# Patient Record
Sex: FEMALE
[De-identification: names, ages, dates, MRNs, and addresses within clinical notes are randomized per-mention and may not be internally consistent; named-entity substitution may affect disease eponyms.]

---

## 2018-12-30 ENCOUNTER — TRANSCRIPTION ENCOUNTER (OUTPATIENT)
Age: 63
End: 2018-12-30

## 2020-08-17 ENCOUNTER — APPOINTMENT (OUTPATIENT)
Dept: RHEUMATOLOGY | Facility: CLINIC | Age: 65
End: 2020-08-17
Payer: COMMERCIAL

## 2020-08-17 VITALS
OXYGEN SATURATION: 100 % | WEIGHT: 118 LBS | HEIGHT: 63 IN | BODY MASS INDEX: 20.91 KG/M2 | HEART RATE: 75 BPM | DIASTOLIC BLOOD PRESSURE: 72 MMHG | RESPIRATION RATE: 14 BRPM | SYSTOLIC BLOOD PRESSURE: 135 MMHG

## 2020-08-17 DIAGNOSIS — Z79.899 OTHER LONG TERM (CURRENT) DRUG THERAPY: ICD-10-CM

## 2020-08-17 DIAGNOSIS — M81.0 AGE-RELATED OSTEOPOROSIS W/OUT CURRENT PATHOLOGICAL FRACTURE: ICD-10-CM

## 2020-08-17 DIAGNOSIS — Z87.19 PERSONAL HISTORY OF OTHER DISEASES OF THE DIGESTIVE SYSTEM: ICD-10-CM

## 2020-08-17 DIAGNOSIS — K21.9 GASTRO-ESOPHAGEAL REFLUX DISEASE W/OUT ESOPHAGITIS: ICD-10-CM

## 2020-08-17 PROCEDURE — 99205 OFFICE O/P NEW HI 60 MIN: CPT | Mod: GC

## 2020-08-17 NOTE — DATA REVIEWED
[FreeTextEntry1] : DEXA 8/21/19:\par T-scores:  spine -2.8 (L4 -3.6);  left FN -1.9,  right FN -2.3;  left TH -1.5;  right TH -2.2;  radius UD -6

## 2020-08-17 NOTE — HISTORY OF PRESENT ILLNESS
[Arthralgias] : arthralgias [FreeTextEntry1] : 65yo F with PMHx of GERD and osteoporosis referred to the clinic by PCP for osteoporosis treatment. The patient reports that in 8/2019, she was found to have Osteoporosis on a regular dexa scan ordered by her PCP. she was found to have a T score of -3 on lumbar spine and -6 in radial/ulnar head. initially she was referred to another rheumatologist who started a process for teriparatide/denosumab treatment for osteoporosis. due to the covid pandemic she was unable to complete the process and was unable to return for a rheumatology follow up.  She is today in the clinic to resume her prior process of osteoporosis treatment. \par \par Today the patient feel well with minimal complains. she has had mild left ankle discomfort after prolong work hours that improve with rest. she has gerd symptoms that improved with conservative measures.\par Pt denies any hx of fractures.  No hx of steroid use.  No hx of hip fracture in parents.

## 2020-08-17 NOTE — PHYSICAL EXAM
[General Appearance - Alert] : alert [General Appearance - In No Acute Distress] : in no acute distress [Sclera] : the sclera and conjunctiva were normal [PERRL With Normal Accommodation] : pupils were equal in size, round, and reactive to light [Neck Appearance] : the appearance of the neck was normal [Auscultation Breath Sounds / Voice Sounds] : lungs were clear to auscultation bilaterally [Heart Rate And Rhythm] : heart rate was normal and rhythm regular [Heart Sounds] : normal S1 and S2 [Cervical Lymph Nodes Enlarged Posterior Bilaterally] : posterior cervical [Cervical Lymph Nodes Enlarged Anterior Bilaterally] : anterior cervical [Supraclavicular Lymph Nodes Enlarged Bilaterally] : supraclavicular [Abnormal Walk] : normal gait [Nail Clubbing] : no clubbing  or cyanosis of the fingernails [Musculoskeletal - Swelling] : no joint swelling seen [Motor Tone] : muscle strength and tone were normal [Oriented To Time, Place, And Person] : oriented to person, place, and time [Outer Ear] : the ears and nose were normal in appearance [Oropharynx] : the oropharynx was normal [Neck Cervical Mass (___cm)] : no neck mass was observed [Thyroid Diffuse Enlargement] : the thyroid was not enlarged [Jugular Venous Distention Increased] : there was no jugular-venous distention [Heart Sounds Gallop] : no gallops [Thyroid Nodule] : there were no palpable thyroid nodules [Murmurs] : no murmurs [Heart Sounds Pericardial Friction Rub] : no pericardial rub [Abdomen Tenderness] : non-tender [Abdomen Soft] : soft [Bowel Sounds] : normal bowel sounds [Edema] : there was no peripheral edema [Abdomen Mass (___ Cm)] : no abdominal mass palpated [] : no hepato-splenomegaly [Skin Turgor] : normal skin turgor [Skin Color & Pigmentation] : normal skin color and pigmentation [Affect] : the affect was normal [No Focal Deficits] : no focal deficits [Impaired Insight] : insight and judgment were intact [FreeTextEntry1] : No synovitis, full ROM in all joints\par

## 2020-08-17 NOTE — ASSESSMENT
[FreeTextEntry1] : 64 Y F with PMHx of Osteoporosis and GERD presented to the clinic to resume  her Osteoporosis care and treatment plan.\par \par \par Impression: \par Osteoporosis with -3 T score on lumbar spine and -6 Radial bone on DEXA scan\par \par \par Impression:\par Treatment options discussed including her prior prescribed treatment , teriparatide and denosumab\par -will continue with teriparatire therapy once the patient clarify with her insurance her coverage.\par -reflux disease and T score of -6 on radial dexa among the reasons to consider denosumab and parathyroid hormone therapy. \par -the patient will return for follow up \par \par d/w Dr. Mendoza\par

## 2020-08-17 NOTE — CONSULT LETTER
[Dear  ___] : Dear  [unfilled], [Consult Letter:] : I had the pleasure of evaluating your patient, [unfilled]. [Please see my note below.] : Please see my note below. [Sincerely,] : Sincerely, [Consult Closing:] : Thank you very much for allowing me to participate in the care of this patient.  If you have any questions, please do not hesitate to contact me. [FreeTextEntry3] : Felipe Mendoza MD\par Rheumatology\par [facility]\par  of Medicine\par [facility] \par \par [address]\[place] [address]\par \par [address]\[place] [address]\par \par [address] [address]\par \par phone:  [phone]\par fax:      [phone]\par

## 2020-08-17 NOTE — END OF VISIT
[] : Fellow [FreeTextEntry3] : I performed a history and physical exam of the patient and discussed his management with the fellow. I reviewed the fellow’s note and agree with the documented findings and plan of care.  64 year old female presents with severe osteoporosis and severe GERD.  Will plan to start Tymlos.  However, pt reports that she was previously told that her copay would be very high.  If she is unable to afford it, will plan to start Prolia instead.  Also discussed importance of calcium/vit D supplementation and weight bearing exercise.

## 2020-08-19 RX ORDER — ABALOPARATIDE 2000 UG/ML
3120 INJECTION, SOLUTION SUBCUTANEOUS
Qty: 1 | Refills: 11 | Status: ACTIVE | COMMUNITY
Start: 2020-08-17

## 2023-11-07 ENCOUNTER — APPOINTMENT (OUTPATIENT)
Dept: ORTHOPEDIC SURGERY | Facility: CLINIC | Age: 68
End: 2023-11-07
Payer: MEDICARE

## 2023-11-07 VITALS — BODY MASS INDEX: 20.91 KG/M2 | HEIGHT: 63 IN | WEIGHT: 118 LBS

## 2023-11-07 DIAGNOSIS — M54.12 RADICULOPATHY, CERVICAL REGION: ICD-10-CM

## 2023-11-07 DIAGNOSIS — M75.21 BICIPITAL TENDINITIS, RIGHT SHOULDER: ICD-10-CM

## 2023-11-07 DIAGNOSIS — M75.41 IMPINGEMENT SYNDROME OF RIGHT SHOULDER: ICD-10-CM

## 2023-11-07 PROCEDURE — 99204 OFFICE O/P NEW MOD 45 MIN: CPT | Mod: 25

## 2023-11-07 PROCEDURE — 73030 X-RAY EXAM OF SHOULDER: CPT | Mod: RT

## 2023-11-07 PROCEDURE — 73010 X-RAY EXAM OF SHOULDER BLADE: CPT | Mod: RT

## 2023-11-08 RX ORDER — METHYLPREDNISOLONE 4 MG/1
4 TABLET ORAL
Qty: 1 | Refills: 0 | Status: ACTIVE | COMMUNITY
Start: 2023-11-07 | End: 1900-01-01

## 2023-11-08 RX ORDER — DICLOFENAC SODIUM 1% 10 MG/G
1 GEL TOPICAL DAILY
Qty: 1 | Refills: 2 | Status: ACTIVE | COMMUNITY
Start: 2023-11-07 | End: 1900-01-01

## 2023-12-19 ENCOUNTER — APPOINTMENT (OUTPATIENT)
Dept: ORTHOPEDIC SURGERY | Facility: CLINIC | Age: 68
End: 2023-12-19

## 2024-04-02 ENCOUNTER — APPOINTMENT (OUTPATIENT)
Dept: ORTHOPEDIC SURGERY | Facility: CLINIC | Age: 69
End: 2024-04-02
Payer: MEDICARE

## 2024-04-02 VITALS — WEIGHT: 125 LBS | BODY MASS INDEX: 22.15 KG/M2 | HEIGHT: 63 IN

## 2024-04-02 DIAGNOSIS — M81.0 AGE-RELATED OSTEOPOROSIS W/OUT CURRENT PATHOLOGICAL FRACTURE: ICD-10-CM

## 2024-04-02 PROCEDURE — 99213 OFFICE O/P EST LOW 20 MIN: CPT

## 2024-04-02 PROCEDURE — 73630 X-RAY EXAM OF FOOT: CPT | Mod: LT

## 2024-04-02 PROCEDURE — 73600 X-RAY EXAM OF ANKLE: CPT | Mod: LT

## 2024-04-02 RX ORDER — ZOLEDRONIC ACID 5 MG/100ML
INJECTION, SOLUTION INTRAVENOUS
Refills: 0 | Status: ACTIVE | COMMUNITY

## 2024-04-02 NOTE — HISTORY OF PRESENT ILLNESS
[0] : 0 [9] : 9 [Dull/Aching] : dull/aching [Frequent] : frequent [Leisure] : leisure [Rest] : rest [Stairs] : stairs [Walking] : walking [de-identified] : 4/2/2024: 69yo F here for intermittent left foot and ankle pain that began since 2023. No injury. Pain worsens with prolonged walking and stairs. Pain starts in the bottom of the foot and radiates up into the ankle. She feels there is weakness. No n/t. Saw a podiatrist and had 2 injections to the dorsal foot that helped slightly. Used shoe inserts which did not help. Tried 8 weeks of PT which provided temporary relief. Denies prior Hx.  [] : no [FreeTextEntry1] : Right foot/ankle

## 2024-04-02 NOTE — PHYSICAL EXAM
[Left] : left foot and ankle [NL (20)] : dorsiflexion 20 degrees [NL (40)] : plantar flexion 40 degrees [5___] : dorsiflexion 5[unfilled]/5 [2+] : dorsalis pedis pulse: 2+ [Mild] : mild diffused ankle swelling [] : no achilles tendon tenderness

## 2024-04-02 NOTE — DISCUSSION/SUMMARY
[de-identified] : Due to chronicity of symptoms despite PT and injections request auth for MRI L ankle MRI L ankle indicated to r/o post tib tendon tear continue PT/HEP Use of brace discussed fu after imaging for review

## 2024-04-03 ENCOUNTER — APPOINTMENT (OUTPATIENT)
Dept: MRI IMAGING | Facility: CLINIC | Age: 69
End: 2024-04-03
Payer: MEDICARE

## 2024-04-03 PROCEDURE — 73721 MRI JNT OF LWR EXTRE W/O DYE: CPT | Mod: LT

## 2024-04-06 ENCOUNTER — TRANSCRIPTION ENCOUNTER (OUTPATIENT)
Age: 69
End: 2024-04-06

## 2024-04-16 ENCOUNTER — APPOINTMENT (OUTPATIENT)
Dept: ORTHOPEDIC SURGERY | Facility: CLINIC | Age: 69
End: 2024-04-16

## 2024-04-30 ENCOUNTER — APPOINTMENT (OUTPATIENT)
Dept: ORTHOPEDIC SURGERY | Facility: CLINIC | Age: 69
End: 2024-04-30
Payer: MEDICARE

## 2024-04-30 DIAGNOSIS — M76.822 POSTERIOR TIBIAL TENDINITIS, LEFT LEG: ICD-10-CM

## 2024-04-30 DIAGNOSIS — S86.012D STRAIN OF LEFT ACHILLES TENDON, SUBSEQUENT ENCOUNTER: ICD-10-CM

## 2024-04-30 DIAGNOSIS — M93.272 OSTEOCHONDRITIS DISSECANS, LEFT ANKLE AND JOINTS OF LEFT FOOT: ICD-10-CM

## 2024-04-30 PROCEDURE — 99213 OFFICE O/P EST LOW 20 MIN: CPT

## 2024-04-30 NOTE — DATA REVIEWED
[MRI] : MRI [Left] : left [Ankle] : ankle [I independently reviewed and interpreted images and report] : I independently reviewed and interpreted images and report [I reviewed the films/CD and agree] : I reviewed the films/CD and agree [FreeTextEntry1] : 4/3/24: 12mm x 9m OCD talar dome; achilles tendinopathy, plantar fascia fraying, post tib tendinopathy and hindfoot valgus with scarring of the tarsal sinus ligaments.

## 2024-04-30 NOTE — DISCUSSION/SUMMARY
[de-identified] : symptoms more concentrated to the achilles today home exercises encouraged PT/HEP wbat in supportive shoes f/u 6 weeks

## 2024-04-30 NOTE — PHYSICAL EXAM
[Left] : left foot and ankle [Mild] : mild diffused ankle swelling [NL (20)] : dorsiflexion 20 degrees [NL (40)] : plantar flexion 40 degrees [5___] : plantar flexion 5[unfilled]/5 [2+] : dorsalis pedis pulse: 2+ [] : achilles tendon insertion tenderness

## 2024-04-30 NOTE — HISTORY OF PRESENT ILLNESS
[9] : 9 [0] : 0 [Dull/Aching] : dull/aching [Frequent] : frequent [Leisure] : leisure [Rest] : rest [Walking] : walking [Stairs] : stairs [de-identified] : 4/2/2024: 69yo F here for intermittent left foot and ankle pain that began since 2023. No injury. Pain worsens with prolonged walking and stairs. Pain starts in the bottom of the foot and radiates up into the ankle. She feels there is weakness. No n/t. Saw a podiatrist and had 2 injections to the dorsal foot that helped slightly. Used shoe inserts which did not help. Tried 8 weeks of PT which provided temporary relief. Denies prior Hx.   04/30/24: Here for MRI Results [] : no [FreeTextEntry1] : Right foot/ankle

## 2024-07-30 ENCOUNTER — APPOINTMENT (OUTPATIENT)
Dept: ORTHOPEDIC SURGERY | Facility: CLINIC | Age: 69
End: 2024-07-30
Payer: MEDICARE

## 2024-07-30 DIAGNOSIS — S86.012D STRAIN OF LEFT ACHILLES TENDON, SUBSEQUENT ENCOUNTER: ICD-10-CM

## 2024-07-30 PROCEDURE — 99213 OFFICE O/P EST LOW 20 MIN: CPT

## 2024-07-30 NOTE — HISTORY OF PRESENT ILLNESS
[9] : 9 [0] : 0 [Dull/Aching] : dull/aching [Frequent] : frequent [Leisure] : leisure [Rest] : rest [Walking] : walking [Stairs] : stairs [de-identified] : 4/2/2024: 67yo F here for intermittent left foot and ankle pain that began since 2023. No injury. Pain worsens with prolonged walking and stairs. Pain starts in the bottom of the foot and radiates up into the ankle. She feels there is weakness. No n/t. Saw a podiatrist and had 2 injections to the dorsal foot that helped slightly. Used shoe inserts which did not help. Tried 8 weeks of PT which provided temporary relief. Denies prior Hx.   04/30/24: Here for MRI Results 07/30/2024 Patient is following up on foot/ankle.  Pt 1x a week with mild improvement. Still some difficulty with stairs.   [] : no [FreeTextEntry1] : Right foot/ankle

## 2024-07-30 NOTE — PHYSICAL EXAM
[Left] : left foot and ankle [Mild] : mild diffused ankle swelling [NL (20)] : dorsiflexion 20 degrees [NL (40)] : plantar flexion 40 degrees [5___] : plantar flexion 5[unfilled]/5 [2+] : dorsalis pedis pulse: 2+ [] : no fifth metatarsal tuberosity tenderness

## 2024-10-01 ENCOUNTER — APPOINTMENT (OUTPATIENT)
Dept: ORTHOPEDIC SURGERY | Facility: CLINIC | Age: 69
End: 2024-10-01
Payer: MEDICARE

## 2024-10-01 DIAGNOSIS — S86.012D STRAIN OF LEFT ACHILLES TENDON, SUBSEQUENT ENCOUNTER: ICD-10-CM

## 2024-10-01 DIAGNOSIS — M93.272 OSTEOCHONDRITIS DISSECANS, LEFT ANKLE AND JOINTS OF LEFT FOOT: ICD-10-CM

## 2024-10-01 PROCEDURE — 99213 OFFICE O/P EST LOW 20 MIN: CPT | Mod: 25

## 2024-10-01 PROCEDURE — J3490M: CUSTOM

## 2024-10-01 PROCEDURE — 20606 DRAIN/INJ JOINT/BURSA W/US: CPT | Mod: LT

## 2024-10-01 NOTE — HISTORY OF PRESENT ILLNESS
[9] : 9 [0] : 0 [Dull/Aching] : dull/aching [Frequent] : frequent [Leisure] : leisure [Rest] : rest [Walking] : walking [Stairs] : stairs [de-identified] : 4/2/2024: 69yo F here for intermittent left foot and ankle pain that began since 2023. No injury. Pain worsens with prolonged walking and stairs. Pain starts in the bottom of the foot and radiates up into the ankle. She feels there is weakness. No n/t. Saw a podiatrist and had 2 injections to the dorsal foot that helped slightly. Used shoe inserts which did not help. Tried 8 weeks of PT which provided temporary relief. Denies prior Hx.   04/30/24: Here for MRI Results 07/30/2024 Patient is following up on foot/ankle.  Pt 1x a week with mild improvement. Still some difficulty with stairs.  10/01/2024 Patient is following up on left ankle. PT/HEP with some improvement. Still w/sharp pain w/activities [] : no [FreeTextEntry1] : Right foot/ankle

## 2024-10-29 ENCOUNTER — APPOINTMENT (OUTPATIENT)
Dept: ORTHOPEDIC SURGERY | Facility: CLINIC | Age: 69
End: 2024-10-29
Payer: MEDICARE

## 2024-10-29 VITALS — HEIGHT: 63 IN | BODY MASS INDEX: 22.15 KG/M2 | WEIGHT: 125 LBS

## 2024-10-29 PROCEDURE — 99212 OFFICE O/P EST SF 10 MIN: CPT

## 2024-11-05 ENCOUNTER — APPOINTMENT (OUTPATIENT)
Dept: ORTHOPEDIC SURGERY | Facility: CLINIC | Age: 69
End: 2024-11-05
Payer: MEDICARE

## 2024-11-05 DIAGNOSIS — M93.272 OSTEOCHONDRITIS DISSECANS, LEFT ANKLE AND JOINTS OF LEFT FOOT: ICD-10-CM

## 2024-11-05 PROCEDURE — 99024 POSTOP FOLLOW-UP VISIT: CPT

## 2024-11-19 ENCOUNTER — APPOINTMENT (OUTPATIENT)
Dept: ORTHOPEDIC SURGERY | Facility: CLINIC | Age: 69
End: 2024-11-19
Payer: MEDICARE

## 2024-11-19 DIAGNOSIS — M93.272 OSTEOCHONDRITIS DISSECANS, LEFT ANKLE AND JOINTS OF LEFT FOOT: ICD-10-CM

## 2024-11-19 PROCEDURE — 99213 OFFICE O/P EST LOW 20 MIN: CPT

## 2025-01-21 ENCOUNTER — APPOINTMENT (OUTPATIENT)
Dept: ORTHOPEDIC SURGERY | Facility: CLINIC | Age: 70
End: 2025-01-21

## 2025-07-28 ENCOUNTER — APPOINTMENT (OUTPATIENT)
Dept: OTOLARYNGOLOGY | Facility: CLINIC | Age: 70
End: 2025-07-28
Payer: MEDICARE

## 2025-07-28 VITALS
HEART RATE: 75 BPM | SYSTOLIC BLOOD PRESSURE: 117 MMHG | HEIGHT: 63 IN | DIASTOLIC BLOOD PRESSURE: 70 MMHG | WEIGHT: 125 LBS | BODY MASS INDEX: 22.15 KG/M2

## 2025-07-28 DIAGNOSIS — J30.9 ALLERGIC RHINITIS, UNSPECIFIED: ICD-10-CM

## 2025-07-28 DIAGNOSIS — R06.83 SNORING: ICD-10-CM

## 2025-07-28 PROCEDURE — 99203 OFFICE O/P NEW LOW 30 MIN: CPT

## 2025-07-28 RX ORDER — CHROMIUM 200 MCG
TABLET ORAL
Refills: 0 | Status: ACTIVE | COMMUNITY

## 2025-08-16 ENCOUNTER — OUTPATIENT (OUTPATIENT)
Dept: OUTPATIENT SERVICES | Facility: HOSPITAL | Age: 70
LOS: 1 days | End: 2025-08-16

## 2025-08-16 DIAGNOSIS — G47.33 OBSTRUCTIVE SLEEP APNEA (ADULT) (PEDIATRIC): ICD-10-CM
